# Patient Record
Sex: FEMALE | Race: BLACK OR AFRICAN AMERICAN | NOT HISPANIC OR LATINO
[De-identification: names, ages, dates, MRNs, and addresses within clinical notes are randomized per-mention and may not be internally consistent; named-entity substitution may affect disease eponyms.]

---

## 2019-02-07 ENCOUNTER — TRANSCRIPTION ENCOUNTER (OUTPATIENT)
Age: 54
End: 2019-02-07

## 2019-07-12 ENCOUNTER — TRANSCRIPTION ENCOUNTER (OUTPATIENT)
Age: 54
End: 2019-07-12

## 2020-04-26 ENCOUNTER — MESSAGE (OUTPATIENT)
Age: 55
End: 2020-04-26

## 2020-06-23 PROBLEM — Z00.00 ENCOUNTER FOR PREVENTIVE HEALTH EXAMINATION: Status: ACTIVE | Noted: 2020-06-23

## 2020-06-24 ENCOUNTER — APPOINTMENT (OUTPATIENT)
Dept: GASTROENTEROLOGY | Facility: CLINIC | Age: 55
End: 2020-06-24
Payer: COMMERCIAL

## 2020-06-24 VITALS
HEIGHT: 61 IN | HEART RATE: 74 BPM | SYSTOLIC BLOOD PRESSURE: 115 MMHG | WEIGHT: 224 LBS | DIASTOLIC BLOOD PRESSURE: 74 MMHG | BODY MASS INDEX: 42.29 KG/M2 | OXYGEN SATURATION: 98 % | RESPIRATION RATE: 15 BRPM

## 2020-06-24 VITALS — TEMPERATURE: 97.7 F

## 2020-06-24 DIAGNOSIS — R73.03 PREDIABETES.: ICD-10-CM

## 2020-06-24 DIAGNOSIS — Z12.11 ENCOUNTER FOR SCREENING FOR MALIGNANT NEOPLASM OF COLON: ICD-10-CM

## 2020-06-24 DIAGNOSIS — Z78.9 OTHER SPECIFIED HEALTH STATUS: ICD-10-CM

## 2020-06-24 DIAGNOSIS — Z86.79 PERSONAL HISTORY OF OTHER DISEASES OF THE CIRCULATORY SYSTEM: ICD-10-CM

## 2020-06-24 PROCEDURE — 99203 OFFICE O/P NEW LOW 30 MIN: CPT

## 2020-06-24 RX ORDER — LOSARTAN POTASSIUM AND HYDROCHLOROTHIAZIDE 100; 25 MG/1; MG/1
100-25 TABLET, FILM COATED ORAL
Refills: 0 | Status: ACTIVE | COMMUNITY

## 2020-06-24 RX ORDER — AMLODIPINE BESYLATE 5 MG/1
5 TABLET ORAL
Refills: 0 | Status: ACTIVE | COMMUNITY

## 2020-06-24 NOTE — HISTORY OF PRESENT ILLNESS
[de-identified] : This is a 55 year old woman who presents for a screening colonoscopy. Denies diarrhea, constipation, rectal bleeding and melena.  She had a colonoscopy 3 years and believes she had one polyp removed. She had a recent echo and stress test at [facility] with Dr. Lao she states everything was fine.\par

## 2020-06-24 NOTE — PHYSICAL EXAM
[General Appearance - In No Acute Distress] : in no acute distress [General Appearance - Alert] : alert [Sclera] : the sclera and conjunctiva were normal [No Spinal Tenderness] : no spinal tenderness [Extraocular Movements] : extraocular movements were intact [No CVA Tenderness] : no ~M costovertebral angle tenderness [Skin Turgor] : normal skin turgor [Skin Color & Pigmentation] : normal skin color and pigmentation [Impaired Insight] : insight and judgment were intact [Oriented To Time, Place, And Person] : oriented to person, place, and time [Affect] : the affect was normal [Neck Appearance] : the appearance of the neck was normal [] : no respiratory distress

## 2020-06-24 NOTE — ASSESSMENT
[FreeTextEntry1] : This is a 55 year old woman who presents for a screening colonoscopy. She feels well. She had a colonoscopy 3 years ago and had a small polyp removed.

## 2020-12-23 PROBLEM — Z12.11 ENCOUNTER FOR SCREENING COLONOSCOPY: Status: RESOLVED | Noted: 2020-06-24 | Resolved: 2020-12-23

## 2021-11-10 ENCOUNTER — EMERGENCY (EMERGENCY)
Facility: HOSPITAL | Age: 56
LOS: 0 days | Discharge: HOME | End: 2021-11-10
Attending: EMERGENCY MEDICINE
Payer: COMMERCIAL

## 2021-11-10 VITALS
SYSTOLIC BLOOD PRESSURE: 130 MMHG | RESPIRATION RATE: 16 BRPM | WEIGHT: 223.11 LBS | DIASTOLIC BLOOD PRESSURE: 96 MMHG | TEMPERATURE: 98 F | HEIGHT: 62 IN | OXYGEN SATURATION: 100 % | HEART RATE: 64 BPM
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
## 2021-11-10 DIAGNOSIS — R07.89 OTHER CHEST PAIN: ICD-10-CM

## 2021-11-10 DIAGNOSIS — I10 ESSENTIAL (PRIMARY) HYPERTENSION: ICD-10-CM

## 2021-11-10 DIAGNOSIS — R20.2 PARESTHESIA OF SKIN: ICD-10-CM

## 2021-11-10 PROCEDURE — 71046 X-RAY EXAM CHEST 2 VIEWS: CPT | Mod: 26

## 2021-11-10 PROCEDURE — 99284 EMERGENCY DEPT VISIT MOD MDM: CPT

## 2021-11-10 PROCEDURE — 93005 ELECTROCARDIOGRAM TRACING: CPT

## 2021-11-10 PROCEDURE — 93010 ELECTROCARDIOGRAM REPORT: CPT

## 2021-11-10 PROCEDURE — 71046 X-RAY EXAM CHEST 2 VIEWS: CPT

## 2021-11-10 PROCEDURE — 99283 EMERGENCY DEPT VISIT LOW MDM: CPT | Mod: 25

## 2021-11-10 RX ORDER — ACETAMINOPHEN 500 MG
650 TABLET ORAL ONCE
Refills: 0 | Status: COMPLETED | OUTPATIENT
Start: 2021-11-10 | End: 2021-11-10

## 2021-11-10 RX ORDER — CYCLOBENZAPRINE HYDROCHLORIDE 10 MG/1
1 TABLET, FILM COATED ORAL
Qty: 15 | Refills: 0
Start: 2021-11-10 | End: 2021-11-14

## 2021-11-10 RX ADMIN — Medication 650 MILLIGRAM(S): at 12:39
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
## 2021-11-10 NOTE — ED STATDOCS - NS ED ROS FT
Constitutional: nad, well appearing  HEENT:  no nasal congestion, eye drainage or ear pain.    CVS:  no cp  Resp:  No sob, no cough  GI:  no abdominal pain, no nausea or vomiting  :  no dysuria  MSK: +neck pain, +head pain  Skin: +burning sensation  Neuro: no change in mental status or level of consciousness. No LOC.  Heme/lymph: no bleeding

## 2021-11-10 NOTE — ED STATDOCS - CARE PROVIDERS DIRECT ADDRESSES
[email or web address]
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
## 2021-11-10 NOTE — ED STATDOCS - CARE PROVIDER_API CALL
Jeronimo Smallwood (MD)  Cardiovascular Disease  [address], [address]  [place] [address]  Phone: [phone]  Fax: [phone]  Follow Up Time:     Sudeep Cheek (DO)  Cardiology  [address] [place] [address]  Phone: [phone]  Fax: [phone]  Follow Up Time:

## 2021-11-10 NOTE — ED STATDOCS - PATIENT PORTAL LINK FT
You can access the FollowMyHealth Patient Portal offered by [facility] by registering at the following website: [email or web address]. By joining [email or web address]’s FollowMyHealth portal, you will also be able to view your health information using other applications (apps) compatible with our system.

## 2021-11-16 PROBLEM — I10 ESSENTIAL (PRIMARY) HYPERTENSION: Chronic | Status: ACTIVE | Noted: 2021-11-10

## 2021-11-19 ENCOUNTER — APPOINTMENT (OUTPATIENT)
Dept: ORTHOPEDIC SURGERY | Facility: CLINIC | Age: 56
End: 2021-11-19
Payer: OTHER MISCELLANEOUS

## 2021-11-19 VITALS — WEIGHT: 220 LBS | HEIGHT: 61.5 IN | BODY MASS INDEX: 41.01 KG/M2

## 2021-11-19 DIAGNOSIS — S79.912A UNSPECIFIED INJURY OF LEFT HIP, INITIAL ENCOUNTER: ICD-10-CM

## 2021-11-19 DIAGNOSIS — S34.109A UNSPECIFIED INJURY TO UNSPECIFIED LVL OF LUMBAR SPINAL CORD, INITIAL ENCOUNTER: ICD-10-CM

## 2021-11-19 DIAGNOSIS — S14.109A UNSPECIFIED INJURY AT UNSPECIFIED LVL OF CERVICAL SPINAL CORD, INITIAL ENCOUNTER: ICD-10-CM

## 2021-11-19 DIAGNOSIS — M54.2 CERVICALGIA: ICD-10-CM

## 2021-11-19 PROCEDURE — 73502 X-RAY EXAM HIP UNI 2-3 VIEWS: CPT | Mod: LT

## 2021-11-19 PROCEDURE — 72100 X-RAY EXAM L-S SPINE 2/3 VWS: CPT

## 2021-11-19 PROCEDURE — 99204 OFFICE O/P NEW MOD 45 MIN: CPT

## 2021-11-19 PROCEDURE — 72040 X-RAY EXAM NECK SPINE 2-3 VW: CPT

## 2021-11-19 PROCEDURE — 99072 ADDL SUPL MATRL&STAF TM PHE: CPT
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
## 2021-11-19 NOTE — REASON FOR VISIT
[Initial Visit] : an initial visit for [Workers' Comp: Date of Injury: _______] : This visit is related to worker's compensation. Date of Injury: [unfilled] [Spouse] : spouse [FreeTextEntry2] : left hip pain

## 2021-11-19 NOTE — PHYSICAL EXAM
[Slightly Antalgic] : slightly antalgic [DP] : dorsalis pedis 2+ and symmetric bilaterally [PT] : posterior tibial 2+ and symmetric bilaterally [Rad] : radial 2+ and symmetric bilaterally [Normal] : Alert and in no acute distress [Poor Appearance] : well-appearing [Acute Distress] : not in acute distress [Obese] : obese [de-identified] : The patient has no respiratory distress. Mood and affect are normal. The patient is alert and oriented to person, place and time.\par Examination of the cervical spine demonstrates no deformity. There is tenderness of the right paracervical muscles and the right trapezius muscle. There is mild muscle spasm. Cervical spine range of motion is right lateral rotation of 40°, left lateral rotation of 40°, extension of 45° and flexion of 45°. Upper extremity neurologic exam is intact with regard to sensation. Motor function is 5 over 5 in all groups in the upper extremities. Deep tendon reflexes are 2+ and equal at the biceps, triceps and brachial radialis.\par Examination of the lumbar spine demonstrates tenderness to the left of the midline. There is mild muscle spasm. There is no deformity. Lumbar flexion is 90°, right lateral flexion 10° and left lateral flexion 10°. Straight leg raise test is negative. Lower extremity neurologic exam is intact with regard to sensation, motor function and deep tendon reflexes.  The patient has moderate pain with active and passive motion of the left hip.  The pain is localized mostly to the posterior aspect of her hip.  There is no pain with knee range of motion.  The calves are soft and nontender.  The skin is intact.  There is no lymphedema. [de-identified] : AP and lateral x-rays of the cervical spine demonstrate no fracture or dislocation.  AP and lateral x-rays of the lumbar spine demonstrate no fracture or dislocation.  AP and lateral x-rays of the left hip demonstrate no fracture or dislocation.

## 2021-12-09 ENCOUNTER — APPOINTMENT (OUTPATIENT)
Dept: PHYSICAL MEDICINE AND REHAB | Facility: CLINIC | Age: 56
End: 2021-12-09
Payer: OTHER MISCELLANEOUS

## 2021-12-09 VITALS — DIASTOLIC BLOOD PRESSURE: 86 MMHG | HEART RATE: 72 BPM | SYSTOLIC BLOOD PRESSURE: 130 MMHG

## 2021-12-09 PROCEDURE — 99204 OFFICE O/P NEW MOD 45 MIN: CPT | Mod: GC

## 2021-12-09 PROCEDURE — 99072 ADDL SUPL MATRL&STAF TM PHE: CPT

## 2021-12-09 NOTE — DATA REVIEWED
[FreeTextEntry1] : X-rays done by Dr. Delgado of [facility] and L hip were all negative for fracture as per his read\par \par \par EXAM: XR CHEST PA LAT 2V\par \par \par PROCEDURE DATE: 11/10/2021\par \par \par \par INTERPRETATION: Exam Date: 11/10/2021 12:52 PM\par \par Chest radiographs\par \par CLINICAL INFORMATION: chest pain\par \par COMPARISON: None\par \par TECHNIQUE: Frontal and lateral views of the chest were obtained.\par \par FINDINGS/\par IMPRESSION:\par \par The lungs are clear. No pleural abnormality is seen.\par \par The heart and mediastinum appear intact.\par \par --- End of Report ---\par \par \par \par \par \par KAYLIE BENITEZ MD; Attending Radiologist\par This document has been electronically signed. Nov 10 2021 12:56PM

## 2021-12-09 NOTE — PHYSICAL EXAM
[FreeTextEntry1] : PE:\par Constitutional: In NAD, calm and cooperative, obese, sitting w/ weight on R side.\par HEENT: NCAT, Anicteric sclera, no lid-lag\par Cardio: Extremities appear pink and well perfused, no peripheral edema\par Respiratory: Normal respiratory effort on room air, no accessory muscle use\par Skin: no rashes seen on exposed skin, no visible abrasions\par Psych: Normal affect, intact judgment and insight\par Neuro: Awake, alert and oriented x 3, see below for focused neurological exam\par MSK (Back)\par                 Inspection: No gross swelling\par                 Palpation: Palpable tenderness over L greater troch and mild over L lumbar paraspinals\par                 ROM: Pain at end lumbar extension/flexion, limited ROM on extension and flexion.\par                 Strength: 5/5 strength RLE throughout. LLE exam limited 2/2 pain with 5/5 knee extension, 4/5 hip flexion,  4/5 ankle dorsiflexion 4/5 plantarflexion.\par                 Reflexes: 2+ Patella reflex bilaterally, 2+ Achilles reflex R, 1+ achilles on left. negative clonus bilaterally\par                 Sensation: Intact to light touch in bilateral lower extremities\par \par Gait: antalgic\par Special tests:\par SLR: positive for L \par Contralateral SLR: positive for L\par JONATHON: positive for L \par FADIR:negative\par Facet loading:negative
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
## 2021-12-09 NOTE — ASSESSMENT
[FreeTextEntry1] : Ms. BEATRIZ DEL REAL is a 56 year old female hx prediabetes/diabetes, hypertension, who presents with primarily left buttocks/low back pain radiating down LLE, and L lateral hip pain. L spine xray, cervical x ray, bilateral hip xray reviewed without fracture/dislocation. Based on history and exam findings suspect both L greater troch bursitis and L lumbosacral radiculopathy. She denies any red flag signs. Would recommend the following: \par \par - Mobic 7.5mg BID x 1 week, and then PRN thereafter. Patient advised on cardiac/gi/renal side effects. Patient encouraged to take medication with food and not with other NSAIDs. \par - PT 2-3x/week for stretching, strengthening (especially of core muscles), ROM exercises, HEP and modalities PRN including myofascial release, moist heat, and TENS therapy \par -MRI L spine non contrast to evaluate for nerve or disc pathology given weakness on exam and decreased Achilles reflex\par \par -Return to clinic in 2 weeks to review imaging as well as evaluate for possible L greater troch bursitis corticosteroid injection if pain has not improved. Patient in agreement with plan. Patient aware of red flag signs including any changes to their bowel/bladder control, groin numbness or new weakness. Patient knows to seek immediate attention by calling 911 or going to nearest ER if these symptoms appear.

## 2021-12-13 ENCOUNTER — RX RENEWAL (OUTPATIENT)
Age: 56
End: 2021-12-13

## 2021-12-13 RX ORDER — DICLOFENAC SODIUM 75 MG/1
75 TABLET, DELAYED RELEASE ORAL
Qty: 60 | Refills: 0 | Status: ACTIVE | COMMUNITY
Start: 2021-11-19 | End: 1900-01-01

## 2021-12-27 ENCOUNTER — APPOINTMENT (OUTPATIENT)
Dept: PHYSICAL MEDICINE AND REHAB | Facility: CLINIC | Age: 56
End: 2021-12-27
Payer: OTHER MISCELLANEOUS

## 2021-12-27 VITALS
WEIGHT: 220 LBS | HEART RATE: 69 BPM | SYSTOLIC BLOOD PRESSURE: 149 MMHG | RESPIRATION RATE: 12 BRPM | HEIGHT: 61 IN | DIASTOLIC BLOOD PRESSURE: 81 MMHG | BODY MASS INDEX: 41.54 KG/M2

## 2021-12-27 PROCEDURE — 99214 OFFICE O/P EST MOD 30 MIN: CPT | Mod: GC

## 2021-12-27 PROCEDURE — 99072 ADDL SUPL MATRL&STAF TM PHE: CPT

## 2021-12-27 RX ORDER — MELOXICAM 7.5 MG/1
7.5 TABLET ORAL DAILY
Qty: 28 | Refills: 0 | Status: ACTIVE | COMMUNITY
Start: 2021-12-09 | End: 1900-01-01

## 2021-12-27 NOTE — PHYSICAL EXAM
[FreeTextEntry1] : PE:\par Constitutional: In NAD, calm and cooperative, obese, sitting w/ weight on R side.\par HEENT: NCAT, Anicteric sclera, no lid-lag\par Cardio: Extremities appear pink and well perfused, no peripheral edema\par Respiratory: Normal respiratory effort on room air, no accessory muscle use\par Skin: no rashes seen on exposed skin, no visible abrasions\par Psych: Normal affect, intact judgment and insight\par Neuro: Awake, alert and oriented x 3, see below for focused neurological exam\par MSK (Back)\par                 Inspection: No gross swelling\par                 Palpation: Palpable tenderness over L greater troch and mild over L lumbar paraspinals\par                 ROM: Pain at end lumbar extension/flexion, limited ROM on extension and flexion.\par                 Strength: 5/5 strength RLE throughout. LLE exam limited 2/2 pain with 5/5 knee extension, 4/5 hip flexion,  4/5 ankle dorsiflexion 4/5 plantarflexion.\par                 Reflexes: 2+ reflexes in bilateral LE,  negative clonus bilaterally\par                 Sensation: Intact to light touch in bilateral lower extremities\par \par Gait: antalgic\par Special tests:\par SLR: negative\par JONATHON: positive for L lateral hip pain\par FADIR:negative\par Facet loading:negative

## 2021-12-27 NOTE — DATA REVIEWED
[FreeTextEntry1] : PATIENT NAME: Savannah Yang\par PATIENT PHONE NUMBER:\par PATIENT ID: [id number]\par : 1965\par DATE OF EXAM: 2021\par R. Phys. Name: Terrell Denise\par R. Phys. Address: [address] [place] [address]\par R. Phys. Phone: [phone]\par MRI-LUMBAR SPINE NON CONTRAST\par \par HISTORY: M54.42 Lower back pain with left sciatica M54.5 Lower Back\par Pain\par \par TECHNIQUE: MR imaging of the lumbar spine was performed without contrast on a\par 1.5 Raquel high-field wide-bore magnet.\par \par COMPARISON: None\par \par FINDINGS:\par \par SEGMENTATION: Normal.\par \par ALIGNMENT: There is no scoliosis.\par \par BONES: Vertebral body heights are maintained. Marrow signal is within normal\par limits.\par \par CONUS: Normal in signal and morphology.\par \par CANAL: No congenital narrowing of the spinal canal.\par \par DISCS: See details below.\par \par L1-L2: There is no disc bulge, herniation, thecal sac compression or foraminal\par narrowing.\par \par L2-L3: There is a disc bulge without significant stenosis.\par \par L3-L4: There is a disc bulge and superimposed right foraminal disc herniation\par narrowing the right neural foramen and impinging upon the exiting right L3 nerve\par root. See series 2 image 5 and series 5 image 19. The central canal is patent.\par \par L4-L5: There is no disc bulge, herniation, thecal sac compression or foraminal\par narrowing.\par \par L5-S1: There is no disc bulge, herniation, thecal sac compression or foraminal\par narrowing.\par \par The visualized abdominal and pelvic structures are unremarkable.\par \par IMPRESSION:\par \par \par Findings most notable for a right foraminal disc herniation at L3-L4 with\par impingement of the exiting right L3 nerve root.\par \par Disc bulge at L2-L3 without significant stenosis.\par \par Signed by: Eric Roe MD\par Signed Date: 2021 10:38 AM EST\par \par \par \par SIGNED BY: Eric Roe M.D., Ext. [phone] 10:38 AM
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
## 2021-12-27 NOTE — ASSESSMENT
[FreeTextEntry1] : Ms. BEATRIZ DEL REAL is a 56 year old female hx prediabetes/diabetes, hypertension, who presents with primarily with L lateral hip pain, less so in her L buttock/low back. L spine xray, cervical x ray, bilateral hip xray reviewed without fracture/dislocation. MRI L Spine showed L3-4 disc herniation with impingement of R L3 nerve root, but patient denies any right sided pain, weakness or numbness. At this time, her pain seems to be originating from L greater trochanteric bursitis and myofascial pain.  She denies any red flag signs. Would recommend the following: \par - Continue  Mobic 7.5mg BID PRN. Patient advised on cardiac/gi/renal side effects. Patient encouraged to take medication with food and not with other NSAIDs. \par - Continue PT 2-3x/week for stretching, strengthening (especially of core muscles), ROM exercises, HEP and modalities PRN including myofascial release, moist heat, and TENS therapy \par \par -Return to clinic in 3 weeks for evaluation and  for possible L greater troch bursitis corticosteroid injection if pain has not improved. Patient in agreement with plan. Patient aware of red flag signs including any changes to their bowel/bladder control, groin numbness or new weakness. Patient knows to seek immediate attention by calling 911 or going to nearest ER if these symptoms appear.

## 2022-01-24 ENCOUNTER — APPOINTMENT (OUTPATIENT)
Dept: PHYSICAL MEDICINE AND REHAB | Facility: CLINIC | Age: 57
End: 2022-01-24
Payer: OTHER MISCELLANEOUS

## 2022-01-24 VITALS
SYSTOLIC BLOOD PRESSURE: 170 MMHG | DIASTOLIC BLOOD PRESSURE: 96 MMHG | HEIGHT: 61 IN | HEART RATE: 71 BPM | BODY MASS INDEX: 41.54 KG/M2 | WEIGHT: 220 LBS | RESPIRATION RATE: 12 BRPM

## 2022-01-24 PROCEDURE — 99072 ADDL SUPL MATRL&STAF TM PHE: CPT

## 2022-01-24 PROCEDURE — 99213 OFFICE O/P EST LOW 20 MIN: CPT

## 2022-01-24 NOTE — ASSESSMENT
[FreeTextEntry1] : Ms. BEATRIZ DEL REAL is a 56 year old female hx prediabetes/diabetes, hypertension, who presents with primarily with L lateral hip pain, less so in her L buttock/low back. L spine xray, cervical x ray, bilateral hip xray reviewed without fracture/dislocation. MRI L Spine showed L3-4 disc herniation with impingement of R L3 nerve root, but patient denies any right sided pain, weakness or numbness. At this time, her pain seems to be originating from L greater trochanteric bursitis and myofascial pain although improved greatly with PT.  She denies any red flag signs. Would recommend the following: \par - Continue occasional Mobic. Patient advised on cardiac/gi/renal side effects. Patient encouraged to take medication with food and not with other NSAIDs. \par - X-ray of L hip ordered\par - Continue PT 2-3x/week for stretching, strengthening (especially of core muscles), ROM exercises, HEP and modalities PRN including myofascial release, moist heat, and TENS therapy. Plan will be to decrease to 1-2x/week and then eventually just to HEP\par - Recommend that patient returns to work at full capacity on 1/31/21. \par \par -Return to clinic in 6 weeks for evaluation and  for possible L greater troch bursitis corticosteroid injection if pain has not improved. Patient in agreement with plan. Patient aware of red flag signs including any changes to their bowel/bladder control, groin numbness or new weakness. Patient knows to seek immediate attention by calling 911 or going to nearest ER if these symptoms appear.

## 2022-01-24 NOTE — PHYSICAL EXAM
[FreeTextEntry1] : PE:\par Constitutional: In NAD, calm and cooperative, obese, sitting w/ weight on R side.\par HEENT: NCAT, Anicteric sclera, no lid-lag\par Cardio: Extremities appear pink and well perfused, no peripheral edema\par Respiratory: Normal respiratory effort on room air, no accessory muscle use\par Skin: no rashes seen on exposed skin, no visible abrasions\par Psych: Normal affect, intact judgment and insight\par Neuro: Awake, alert and oriented x 3, see below for focused neurological exam\par MSK (Back)\par                 Inspection: No gross swelling\par                 Palpation: Mild palpable tenderness over L greater troch and mild over L lumbar paraspinals\par                 ROM: Pain at end lumbar extension/flexion, limited ROM on extension and flexion.\par                 Strength: 5/5 strength throughout LE\par                 Reflexes: 2+ reflexes in bilateral LE,  negative clonus bilaterally\par                 Sensation: Intact to light touch in bilateral lower extremities\par \par Gait: antalgic\par Special tests:\par SLR: negative\par JONATHON: positive for L lateral hip pain\par FADIR:negative\par Facet loading:negative
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
## 2022-03-07 ENCOUNTER — APPOINTMENT (OUTPATIENT)
Dept: PHYSICAL MEDICINE AND REHAB | Facility: CLINIC | Age: 57
End: 2022-03-07
Payer: OTHER MISCELLANEOUS

## 2022-03-07 VITALS
HEIGHT: 61 IN | HEART RATE: 88 BPM | SYSTOLIC BLOOD PRESSURE: 160 MMHG | BODY MASS INDEX: 41.54 KG/M2 | DIASTOLIC BLOOD PRESSURE: 89 MMHG | WEIGHT: 220 LBS | RESPIRATION RATE: 12 BRPM

## 2022-03-07 DIAGNOSIS — M25.552 PAIN IN LEFT HIP: ICD-10-CM

## 2022-03-07 DIAGNOSIS — M79.18 MYALGIA, OTHER SITE: ICD-10-CM

## 2022-03-07 DIAGNOSIS — M70.62 TROCHANTERIC BURSITIS, LEFT HIP: ICD-10-CM

## 2022-03-07 PROCEDURE — 99072 ADDL SUPL MATRL&STAF TM PHE: CPT

## 2022-03-07 PROCEDURE — 99213 OFFICE O/P EST LOW 20 MIN: CPT

## 2022-03-07 NOTE — ASSESSMENT
[FreeTextEntry1] : Ms. BEATRIZ DEL REAL is a 56 year old female hx prediabetes/diabetes, hypertension, who presented with primarily with L lateral hip pain, less so in her L buttock/low back. MRI L Spine showed L3-4 disc herniation with impingement of R L3 nerve root, but patient denies any right sided pain, weakness or numbness. At this time, her pain seems to be originating from L greater trochanteric bursitis and myofascial pain although improved greatly with PT.  She denies any red flag signs. Would recommend the following: \par - X-ray of L hip ordered\par - Continue PT 2-3x/week for stretching, strengthening (especially of core muscles), ROM exercises, HEP and modalities PRN including myofascial release, moist heat, and TENS therapy. Plan will be to decrease to 1-2x/week and then eventually just to HEP\par - Recommend that patient continue to work at full capacity\par \par -Return to clinic as needed. Patient in agreement with plan. Patient aware of red flag signs including any changes to their bowel/bladder control, groin numbness or new weakness. Patient knows to seek immediate attention by calling 911 or going to nearest ER if these symptoms appear.

## 2022-03-07 NOTE — HISTORY OF PRESENT ILLNESS
[FreeTextEntry1] : Ms. BEATRIZ DEL REAL is a 56 year old  female who presents for follow up. At last visit, X-ray of L hip was ordered, she was continued on PT and was cleared to return to work. She has been tolerating working. Says pain is minimal at this time. Continues to do PT. \par \par Location: Left lateral hip,less so in L buttocks now\par Onset: Day after accident 11/11/21. \par Provocation/Palliative: sitting on the left side, lying down makes it better. better with heat or NSAID. \par Quality: buttocks pain is squeezing pain. lateral hip- sharp shooting, then aching. \par Radiation:radiating posterior thigh does not pass knee, and sometimes radiates upwards towards lower back. \par Severity: very minimal today.\par Timing:constant , worst in AM but better by end of day, although much improved lately\par \par No bowel/bladder changes. No groin numbness.

## 2022-03-07 NOTE — PHYSICAL EXAM
[FreeTextEntry1] : PE:\par Constitutional: In NAD, calm and cooperative\par MSK (Back)\par                 Inspection: No gross swelling\par                 Palpation: Minimal palpable tenderness over L greater troch and mild over L lumbar paraspinals\par                 ROM:No Pain at end lumbar extension/flexion\par                 Strength: 5/5 strength throughout LE\par                 Reflexes: 2+ reflexes in bilateral LE,  negative clonus bilaterally\par                 Sensation: Intact to light touch in bilateral lower extremities\par \par Gait: antalgic\par Special tests:\par SLR: negative\par JONATHON: positive for L lateral hip pain\par FADIR:negative\par Facet loading:negative

## 2022-10-13 ENCOUNTER — APPOINTMENT (OUTPATIENT)
Dept: PHYSICAL MEDICINE AND REHAB | Facility: CLINIC | Age: 57
End: 2022-10-13

## 2022-10-13 VITALS
SYSTOLIC BLOOD PRESSURE: 133 MMHG | HEIGHT: 61 IN | DIASTOLIC BLOOD PRESSURE: 81 MMHG | WEIGHT: 221 LBS | BODY MASS INDEX: 41.72 KG/M2 | RESPIRATION RATE: 14 BRPM | HEART RATE: 76 BPM

## 2022-10-13 DIAGNOSIS — M70.61 TROCHANTERIC BURSITIS, RIGHT HIP: ICD-10-CM

## 2022-10-13 DIAGNOSIS — M54.9 DORSALGIA, UNSPECIFIED: ICD-10-CM

## 2022-10-13 DIAGNOSIS — M47.816 SPONDYLOSIS W/OUT MYELOPATHY OR RADICULOPATHY, LUMBAR REGION: ICD-10-CM

## 2022-10-13 DIAGNOSIS — M25.551 PAIN IN RIGHT HIP: ICD-10-CM

## 2022-10-13 DIAGNOSIS — M54.16 RADICULOPATHY, LUMBAR REGION: ICD-10-CM

## 2022-10-13 PROCEDURE — 99214 OFFICE O/P EST MOD 30 MIN: CPT

## 2022-10-14 RX ORDER — MELOXICAM 7.5 MG/1
7.5 TABLET ORAL
Qty: 30 | Refills: 0 | Status: ACTIVE | COMMUNITY
Start: 2022-10-14 | End: 1900-01-01

## 2022-10-21 PROBLEM — M70.61 GREATER TROCHANTERIC BURSITIS OF RIGHT HIP: Status: ACTIVE | Noted: 2022-10-13

## 2022-10-21 PROBLEM — M47.816 LUMBAR SPONDYLOSIS: Status: ACTIVE | Noted: 2022-10-13

## 2022-10-21 PROBLEM — M54.16 ACUTE LUMBAR RADICULOPATHY: Status: ACTIVE | Noted: 2021-12-09

## 2022-10-21 PROBLEM — M54.9 BACK PAIN: Status: ACTIVE | Noted: 2021-11-19

## 2022-10-21 NOTE — DATA REVIEWED
[FreeTextEntry1] : MRI L Spine 21 reviewed by me: R L3-4 disc herniation with R L3 nerve root impingement\par \par PATIENT NAME: Savannah Yang\par PATIENT PHONE NUMBER:\par PATIENT ID: [id number]\par : 1965\par DATE OF EXAM: 2021\par R. Phys. Name: Terrell Denise\par R. Phys. Address: [address] [place] [address]\par R. Phys. Phone: [phone]\par MRI-LUMBAR SPINE NON CONTRAST\par \par HISTORY: M54.42 Lower back pain with left sciatica M54.5 Lower Back\par Pain\par \par TECHNIQUE: MR imaging of the lumbar spine was performed without contrast on a\par 1.5 Raquel high-field wide-bore magnet.\par \par COMPARISON: None\par \par FINDINGS:\par \par SEGMENTATION: Normal.\par \par ALIGNMENT: There is no scoliosis.\par \par BONES: Vertebral body heights are maintained. Marrow signal is within normal\par limits.\par \par CONUS: Normal in signal and morphology.\par \par CANAL: No congenital narrowing of the spinal canal.\par \par DISCS: See details below.\par \par L1-L2: There is no disc bulge, herniation, thecal sac compression or foraminal\par narrowing.\par \par L2-L3: There is a disc bulge without significant stenosis.\par \par L3-L4: There is a disc bulge and superimposed right foraminal disc herniation\par narrowing the right neural foramen and impinging upon the exiting right L3 nerve\par root. See series 2 image 5 and series 5 image 19. The central canal is patent.\par \par L4-L5: There is no disc bulge, herniation, thecal sac compression or foraminal\par narrowing.\par \par L5-S1: There is no disc bulge, herniation, thecal sac compression or foraminal\par narrowing.\par \par The visualized abdominal and pelvic structures are unremarkable.\par \par IMPRESSION:\par \par \par Findings most notable for a right foraminal disc herniation at L3-L4 with\par impingement of the exiting right L3 nerve root.\par \par Disc bulge at L2-L3 without significant stenosis.\par \par Signed by: Eric Roe MD\par Signed Date: 2021 10:38 AM EST\par \par SIGNED BY: Eric Roe M.D., Ext. [phone] 10:38 AM

## 2022-10-21 NOTE — PHYSICAL EXAM
[FreeTextEntry1] : PE:\par Constitutional: In NAD, calm and cooperative\par \par MSK (Hips)\par 	Inspection: no gross swelling identified\par 	Palpation: Tenderness over the R greater trochanteric bursa\par 	ROM: Pain ER of R hip, no pain with IR of L hip\par 	Strength: 5/5 strength in bilateral lower extremities\par 	Reflexes: 2+ Patella reflex bilaterally, 2+ Achilles reflex bilaterally, negative clonus bilaterally\par 	Sensation: Intact to light touch in bilateral lower extremities\par Special tests:\par SLR:negative bilaterally\par JONATHON:positive on right, negative on left\par FADIR: negative bilaterally\par \par

## 2022-10-21 NOTE — ASSESSMENT
[FreeTextEntry1] : Ms. BEATRIZ DEL REAL is a 57 year old female who presents with R hip pain starting in 9/2022, without inciting event. She does have a history of L hip pain from a work accident, which is currently controlled. Pain is likely due to GTB of her R hip, less likely due to underlying spondylosis/radiculopathy. Denies any red flag signs. Will recommend:\par - MRI L Spine reviewed\par - X-ray of R hip\par - Start Mobic 7.5mg BID x 1 week, and then PRN thereafter. Patient advised on cardiac/gi/renal side effects. Patient encouraged to take medication with food and not with other NSAIDs. \par - Start PT 1-2x/week for stretching, strengthening (especially of hip abductors), ROM exercises, HEP and modalities PRN including myofascial release, moist heat \par \par RTC in 4-5 weeks. Patient aware of red flag signs including any changes to their bowel/bladder control, groin numbness or new weakness. Patient knows to seek immediate attention by calling 911 or going to nearest ER if these symptoms appear.

## 2022-10-21 NOTE — HISTORY OF PRESENT ILLNESS
[FreeTextEntry1] : Ms. BEATRIZ DEL REAL is a 57 year old female who presents with hip/buttock pain. \par \par Location:R lateral hip/buttock area\par Onset: Late 9/2022, no inciting event\par Provocation/Palliative:Worse with prolonged standing and sitting, better with laying down\par Quality:Tight, ache, sharp\par Radiation:Slightly down R lateral thigh\par Severity:Moderate to severe\par Timing:Not improving with time\par \par Denies any associated numbness. Denies any associated leg weakness. Denies any loss of bowel/bladder control or any groin numbness.\par Previous medications trialed:Tylenol with some relief\par Previous procedures relevant to complaint:None\par Conservative therapy tried?:No recent PT

## 2023-05-12 ENCOUNTER — OFFICE (OUTPATIENT)
Dept: URBAN - METROPOLITAN AREA CLINIC 12 | Facility: CLINIC | Age: 58
Setting detail: OPHTHALMOLOGY
End: 2023-05-12
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
## 2023-05-12 PROCEDURE — NO SHOW FE NO SHOW FEE: Performed by: OPHTHALMOLOGY

## 2023-09-30 ENCOUNTER — OFFICE (OUTPATIENT)
Dept: URBAN - METROPOLITAN AREA CLINIC 12 | Facility: CLINIC | Age: 58
Setting detail: OPHTHALMOLOGY
End: 2023-09-30
Payer: COMMERCIAL

## 2023-09-30 DIAGNOSIS — H43.393: ICD-10-CM

## 2023-09-30 DIAGNOSIS — E11.9: ICD-10-CM

## 2023-09-30 DIAGNOSIS — H25.13: ICD-10-CM

## 2023-09-30 PROCEDURE — 92014 COMPRE OPH EXAM EST PT 1/>: CPT | Performed by: OPHTHALMOLOGY

## 2023-09-30 ASSESSMENT — REFRACTION_MANIFEST
OS_VA1: 20/20
OD_AXIS: 130
OD_CYLINDER: -0.25
OS_SPHERE: -2.50
OS_SPHERE: -2.50
OD_VA1: 20/20
OD_AXIS: 105
OD_ADD: +2.25
OD_SPHERE: -2.50
OD_SPHERE: -2.75
OS_ADD: +2.00
OS_AXIS: 090
OS_VA1: 20/20
OD_CYLINDER: -0.25
OS_ADD: +2.25
OD_ADD: +2.00
OS_CYLINDER: -0.25
OS_AXIS: 70
OS_CYLINDER: -0.25
OD_VA1: 20/20

## 2023-09-30 ASSESSMENT — SPHEQUIV_DERIVED
OD_SPHEQUIV: -2.875
OD_SPHEQUIV: -2.625
OS_SPHEQUIV: -2.625
OS_SPHEQUIV: -2.375
OD_SPHEQUIV: -2.5
OS_SPHEQUIV: -2.625

## 2023-09-30 ASSESSMENT — CONFRONTATIONAL VISUAL FIELD TEST (CVF)
OS_FINDINGS: FULL
OD_FINDINGS: FULL

## 2023-09-30 ASSESSMENT — REFRACTION_AUTOREFRACTION
OD_AXIS: 103
OS_CYLINDER: -0.25
OS_SPHERE: -2.25
OD_CYLINDER: -0.50
OD_SPHERE: -2.25
OS_AXIS: 148

## 2023-09-30 ASSESSMENT — VISUAL ACUITY
OS_BCVA: 20/20
OD_BCVA: 20/20

## 2023-09-30 ASSESSMENT — TONOMETRY
OS_IOP_MMHG: 12
OD_IOP_MMHG: 11

## 2023-11-17 ENCOUNTER — APPOINTMENT (OUTPATIENT)
Dept: BARIATRICS/WEIGHT MGMT | Facility: CLINIC | Age: 58
End: 2023-11-17
Payer: COMMERCIAL

## 2023-11-17 PROCEDURE — 99205 OFFICE O/P NEW HI 60 MIN: CPT | Mod: 95

## 2023-12-04 ENCOUNTER — TRANSCRIPTION ENCOUNTER (OUTPATIENT)
Age: 58
End: 2023-12-04

## 2023-12-15 ENCOUNTER — APPOINTMENT (OUTPATIENT)
Dept: BARIATRICS/WEIGHT MGMT | Facility: CLINIC | Age: 58
End: 2023-12-15
Payer: COMMERCIAL

## 2023-12-15 PROCEDURE — 99215 OFFICE O/P EST HI 40 MIN: CPT | Mod: 95

## 2023-12-19 NOTE — ASSESSMENT
[FreeTextEntry1] : Bariatric surgery history: none Overweight / obesity comorbidities: htn Current anti-obesity medications: none Obesity medication side effects: n/a  -questions answered; and discussed how medications are best used alongside lifestyle -discussed how phentermine is still an option as a bridge, Savannah still has them -additional time spent obtaining insurance approval of [facility] medication

## 2023-12-19 NOTE — HISTORY OF PRESENT ILLNESS
[Home] : at home, [unfilled] , at the time of the visit. [Other Location: e.g. Home (Enter Location, City,State)___] : at [unfilled] [Verbal consent obtained from patient] : the patient, [unfilled] [FreeTextEntry1] : Patient presents for weight loss and overweight/obese comorbidity management  michael is weight stable has been focused mainly on medications has questions about coverage and options did not start the phen tpm  Due to comorbidities this patient requires medical treatment for overweight / obesity

## 2024-01-18 ENCOUNTER — APPOINTMENT (OUTPATIENT)
Dept: BARIATRICS/WEIGHT MGMT | Facility: CLINIC | Age: 59
End: 2024-01-18

## 2024-04-03 ENCOUNTER — APPOINTMENT (OUTPATIENT)
Dept: BARIATRICS/WEIGHT MGMT | Facility: CLINIC | Age: 59
End: 2024-04-03
Payer: COMMERCIAL

## 2024-04-03 ENCOUNTER — OUTPATIENT (OUTPATIENT)
Dept: OUTPATIENT SERVICES | Facility: HOSPITAL | Age: 59
LOS: 1 days | End: 2024-04-03
Payer: COMMERCIAL

## 2024-04-03 PROCEDURE — G0463: CPT

## 2024-04-03 PROCEDURE — 99214 OFFICE O/P EST MOD 30 MIN: CPT | Mod: 95

## 2024-04-03 RX ORDER — TOPIRAMATE 25 MG/1
25 TABLET, FILM COATED ORAL
Qty: 120 | Refills: 5 | Status: ACTIVE | COMMUNITY
Start: 2023-11-17 | End: 1900-01-01

## 2024-04-04 DIAGNOSIS — I10 ESSENTIAL (PRIMARY) HYPERTENSION: ICD-10-CM

## 2024-04-05 RX ORDER — PHENTERMINE HYDROCHLORIDE 37.5 MG/1
37.5 TABLET ORAL
Qty: 30 | Refills: 5 | Status: ACTIVE | COMMUNITY
Start: 2023-11-17 | End: 1900-01-01

## 2024-04-12 NOTE — HISTORY OF PRESENT ILLNESS
[Home] : at home, [unfilled] , at the time of the visit. [Other Location: e.g. Home (Enter Location, City,State)___] : at [unfilled] [Verbal consent obtained from patient] : the patient, [unfilled] [FreeTextEntry1] : Patient presents for weight loss and overweight/obese comorbidity management  doing fine has had some challenges with major lifestyle changes given work schedule but partner shania has been engaged and they are working together has found phen tpm is tolerating helping  Due to comorbidities this patient requires medical treatment for overweight / obesity

## 2024-04-12 NOTE — ASSESSMENT
[FreeTextEntry1] : Bariatric surgery history: none Overweight / obesity comorbidities: htn Current anti-obesity medications: phen tpm Obesity medication side effects: none  continue to work with shania on meal prep, S/V meals incr phen tpm to max dose consider change of medication if needed in future

## 2024-04-16 DIAGNOSIS — E66.01 MORBID (SEVERE) OBESITY DUE TO EXCESS CALORIES: ICD-10-CM

## 2024-06-12 ENCOUNTER — APPOINTMENT (OUTPATIENT)
Dept: BARIATRICS/WEIGHT MGMT | Facility: CLINIC | Age: 59
End: 2024-06-12
Payer: COMMERCIAL

## 2024-06-12 VITALS — BODY MASS INDEX: 42.32 KG/M2 | WEIGHT: 224 LBS

## 2024-06-12 DIAGNOSIS — I10 ESSENTIAL (PRIMARY) HYPERTENSION: ICD-10-CM

## 2024-06-12 PROCEDURE — 99214 OFFICE O/P EST MOD 30 MIN: CPT

## 2024-06-13 PROBLEM — I10 ESSENTIAL HYPERTENSION: Status: ACTIVE | Noted: 2023-11-17

## 2024-06-13 NOTE — ASSESSMENT
[FreeTextEntry1] : Bariatric surgery history: none Overweight / obesity comorbidities: htn Current anti-obesity medications: phen tpm Obesity medication side effects: none  will consult with partner about cost of wegovy vs. zepbound contact us with decision encouraged lifestyle plan additional time spent on med coordination approval

## 2024-06-13 NOTE — HISTORY OF PRESENT ILLNESS
[FreeTextEntry1] : Patient presents for weight loss and overweight/obese comorbidity management  weight stable, incredibly busy but work life balance will improve over next few months focusing more on self, becoming more active medication helping questions about alternatives  Due to comorbidities this patient requires medical treatment for overweight / obesity

## 2024-06-28 ENCOUNTER — APPOINTMENT (OUTPATIENT)
Dept: BARIATRICS/WEIGHT MGMT | Facility: CLINIC | Age: 59
End: 2024-06-28
Payer: COMMERCIAL

## 2024-06-28 DIAGNOSIS — I10 ESSENTIAL (PRIMARY) HYPERTENSION: ICD-10-CM

## 2024-06-28 DIAGNOSIS — E66.01 MORBID (SEVERE) OBESITY DUE TO EXCESS CALORIES: ICD-10-CM

## 2024-06-28 PROCEDURE — 99214 OFFICE O/P EST MOD 30 MIN: CPT

## 2024-06-29 PROBLEM — E66.01 CLASS 3 OBESITY: Status: ACTIVE | Noted: 2023-11-17

## 2024-06-29 PROBLEM — I10 BENIGN HYPERTENSION: Status: ACTIVE | Noted: 2023-11-17

## 2024-07-05 ENCOUNTER — TRANSCRIPTION ENCOUNTER (OUTPATIENT)
Age: 59
End: 2024-07-05

## 2024-07-12 RX ORDER — SEMAGLUTIDE 0.25 MG/.5ML
0.25 INJECTION, SOLUTION SUBCUTANEOUS
Qty: 1 | Refills: 1 | Status: ACTIVE | COMMUNITY
Start: 2024-07-12 | End: 1900-01-01

## 2024-07-12 RX ORDER — SEMAGLUTIDE 0.5 MG/.5ML
0.5 INJECTION, SOLUTION SUBCUTANEOUS
Qty: 1 | Refills: 2 | Status: ACTIVE | COMMUNITY
Start: 2024-07-12 | End: 1900-01-01

## 2024-09-04 ENCOUNTER — OUTPATIENT (OUTPATIENT)
Dept: OUTPATIENT SERVICES | Facility: HOSPITAL | Age: 59
LOS: 1 days | End: 2024-09-04
Payer: COMMERCIAL

## 2024-09-04 ENCOUNTER — APPOINTMENT (OUTPATIENT)
Dept: BARIATRICS/WEIGHT MGMT | Facility: CLINIC | Age: 59
End: 2024-09-04
Payer: COMMERCIAL

## 2024-09-04 DIAGNOSIS — E66.01 MORBID (SEVERE) OBESITY DUE TO EXCESS CALORIES: ICD-10-CM

## 2024-09-04 DIAGNOSIS — I10 ESSENTIAL (PRIMARY) HYPERTENSION: ICD-10-CM

## 2024-09-04 PROCEDURE — G0463: CPT

## 2024-09-04 PROCEDURE — 99214 OFFICE O/P EST MOD 30 MIN: CPT | Mod: 95

## 2024-09-05 NOTE — HISTORY OF PRESENT ILLNESS
[Home] : at home, [unfilled] , at the time of the visit. [Other Location: e.g. Home (Enter Location, City,State)___] : at [unfilled] [Verbal consent obtained from patient] : the patient, [unfilled] [FreeTextEntry1] : Patient presents for weight loss and overweight/obese comorbidity management  doing well has not weighed but clothes looser following a prot/veg pattern

## 2024-09-05 NOTE — ASSESSMENT
[FreeTextEntry1] : Bariatric surgery history: none Overweight / obesity comorbidities: htn Current anti-obesity medications: wegovy Obesity medication side effects: none  incr wegovy to 1 mg review nutrition again in future as visit allows

## 2024-09-09 DIAGNOSIS — E66.01 MORBID (SEVERE) OBESITY DUE TO EXCESS CALORIES: ICD-10-CM

## 2024-09-24 ENCOUNTER — TRANSCRIPTION ENCOUNTER (OUTPATIENT)
Age: 59
End: 2024-09-24

## 2024-11-13 ENCOUNTER — APPOINTMENT (OUTPATIENT)
Dept: BARIATRICS/WEIGHT MGMT | Facility: CLINIC | Age: 59
End: 2024-11-13
Payer: COMMERCIAL

## 2024-11-13 VITALS — BODY MASS INDEX: 40.06 KG/M2 | WEIGHT: 212 LBS

## 2024-11-13 DIAGNOSIS — I10 ESSENTIAL (PRIMARY) HYPERTENSION: ICD-10-CM

## 2024-11-13 DIAGNOSIS — E66.813 OBESITY, CLASS 3: ICD-10-CM

## 2024-11-13 PROCEDURE — G2211 COMPLEX E/M VISIT ADD ON: CPT

## 2024-11-13 PROCEDURE — 99214 OFFICE O/P EST MOD 30 MIN: CPT

## 2024-11-13 RX ORDER — SEMAGLUTIDE 2.4 MG/.75ML
2.4 INJECTION, SOLUTION SUBCUTANEOUS
Qty: 3 | Refills: 1 | Status: ACTIVE | COMMUNITY
Start: 2024-11-13 | End: 1900-01-01

## 2024-11-22 ENCOUNTER — TRANSCRIPTION ENCOUNTER (OUTPATIENT)
Age: 59
End: 2024-11-22

## 2024-12-11 ENCOUNTER — TRANSCRIPTION ENCOUNTER (OUTPATIENT)
Age: 59
End: 2024-12-11

## 2024-12-17 VITALS — WEIGHT: 205 LBS | BODY MASS INDEX: 38.73 KG/M2

## 2025-01-16 ENCOUNTER — APPOINTMENT (OUTPATIENT)
Dept: BARIATRICS/WEIGHT MGMT | Facility: CLINIC | Age: 60
End: 2025-01-16
Payer: COMMERCIAL

## 2025-01-16 ENCOUNTER — OUTPATIENT (OUTPATIENT)
Dept: OUTPATIENT SERVICES | Facility: HOSPITAL | Age: 60
LOS: 1 days | End: 2025-01-16
Payer: COMMERCIAL

## 2025-01-16 DIAGNOSIS — I10 ESSENTIAL (PRIMARY) HYPERTENSION: ICD-10-CM

## 2025-01-16 DIAGNOSIS — E66.813 OBESITY, CLASS 3: ICD-10-CM

## 2025-01-16 PROCEDURE — G0463: CPT

## 2025-01-16 PROCEDURE — 99213 OFFICE O/P EST LOW 20 MIN: CPT | Mod: 95

## 2025-01-22 ENCOUNTER — OFFICE (OUTPATIENT)
Dept: URBAN - METROPOLITAN AREA CLINIC 12 | Facility: CLINIC | Age: 60
Setting detail: OPHTHALMOLOGY
End: 2025-01-22
Payer: COMMERCIAL

## 2025-01-22 DIAGNOSIS — H52.4: ICD-10-CM

## 2025-01-22 DIAGNOSIS — H52.7: ICD-10-CM

## 2025-01-22 DIAGNOSIS — E66.813 OBESITY, CLASS 3: ICD-10-CM

## 2025-01-22 DIAGNOSIS — H43.393: ICD-10-CM

## 2025-01-22 DIAGNOSIS — H25.13: ICD-10-CM

## 2025-01-22 DIAGNOSIS — E11.9: ICD-10-CM

## 2025-01-22 DIAGNOSIS — H31.001: ICD-10-CM

## 2025-01-22 PROCEDURE — 92250 FUNDUS PHOTOGRAPHY W/I&R: CPT | Performed by: STUDENT IN AN ORGANIZED HEALTH CARE EDUCATION/TRAINING PROGRAM

## 2025-01-22 PROCEDURE — 92014 COMPRE OPH EXAM EST PT 1/>: CPT | Performed by: STUDENT IN AN ORGANIZED HEALTH CARE EDUCATION/TRAINING PROGRAM

## 2025-01-22 PROCEDURE — 92015 DETERMINE REFRACTIVE STATE: CPT | Performed by: STUDENT IN AN ORGANIZED HEALTH CARE EDUCATION/TRAINING PROGRAM

## 2025-01-22 ASSESSMENT — REFRACTION_MANIFEST
OS_AXIS: 090
OD_AXIS: 105
OS_AXIS: 70
OS_SPHERE: -2.50
OD_ADD: +2.00
OD_SPHERE: -2.75
OD_ADD: +2.25
OS_SPHERE: -2.50
OS_VA1: 20/20
OD_ADD: +2.25
OD_SPHERE: -2.50
OS_CYLINDER: -0.50
OD_VA1: 20/20
OS_VA1: 20/20
OD_AXIS: 130
OS_ADD: +2.25
OS_ADD: +2.25
OD_VA1: 20/20
OS_ADD: +2.00
OS_SPHERE: -2.50
OD_CYLINDER: -0.25
OD_CYLINDER: -0.25
OS_CYLINDER: -0.25
OS_VA1: 20/20
OD_VA1: 20/20
OD_SPHERE: -2.75
OD_CYLINDER: SPH
OS_CYLINDER: -0.25
OS_AXIS: 085

## 2025-01-22 ASSESSMENT — VISUAL ACUITY
OD_BCVA: 20/100-1
OS_BCVA: 20/20-1

## 2025-01-22 ASSESSMENT — KERATOMETRY
OD_K1POWER_DIOPTERS: 45.25
OD_AXISANGLE_DEGREES: 135
OS_K2POWER_DIOPTERS: 46.00
METHOD_AUTO_MANUAL: AUTO
OS_K1POWER_DIOPTERS: 45.50
OD_K2POWER_DIOPTERS: 46.00
OS_AXISANGLE_DEGREES: 132

## 2025-01-22 ASSESSMENT — REFRACTION_AUTOREFRACTION
OS_AXIS: 081
OD_AXIS: 075
OD_SPHERE: -2.00
OD_CYLINDER: -0.75
OS_SPHERE: -2.25
OS_CYLINDER: -0.75

## 2025-01-22 ASSESSMENT — TONOMETRY
OD_IOP_MMHG: 14
OS_IOP_MMHG: 16

## 2025-01-22 ASSESSMENT — CONFRONTATIONAL VISUAL FIELD TEST (CVF)
OD_FINDINGS: FULL
OS_FINDINGS: FULL

## 2025-01-24 ENCOUNTER — TRANSCRIPTION ENCOUNTER (OUTPATIENT)
Age: 60
End: 2025-01-24

## 2025-08-21 ENCOUNTER — TRANSCRIPTION ENCOUNTER (OUTPATIENT)
Age: 60
End: 2025-08-21